# Patient Record
Sex: FEMALE | Race: WHITE | NOT HISPANIC OR LATINO
[De-identification: names, ages, dates, MRNs, and addresses within clinical notes are randomized per-mention and may not be internally consistent; named-entity substitution may affect disease eponyms.]

---

## 2018-08-08 PROBLEM — Z00.00 ENCOUNTER FOR PREVENTIVE HEALTH EXAMINATION: Status: ACTIVE | Noted: 2018-08-08

## 2018-08-13 PROBLEM — Z86.39 HISTORY OF HYPERLIPIDEMIA: Status: RESOLVED | Noted: 2018-08-13 | Resolved: 2018-08-13

## 2018-08-13 PROBLEM — Z80.0 FAMILY HISTORY OF MALIGNANT NEOPLASM OF COLON: Status: ACTIVE | Noted: 2018-08-13

## 2018-08-13 PROBLEM — Z87.09 HISTORY OF ASTHMA: Status: RESOLVED | Noted: 2018-08-13 | Resolved: 2018-08-13

## 2018-08-13 PROBLEM — Z86.39 HISTORY OF THYROID DISORDER: Status: RESOLVED | Noted: 2018-08-13 | Resolved: 2018-08-13

## 2018-08-13 PROBLEM — Z86.59 HISTORY OF DEPRESSION: Status: RESOLVED | Noted: 2018-08-13 | Resolved: 2018-08-13

## 2018-08-13 PROBLEM — Z87.39 HISTORY OF FIBROMYALGIA: Status: RESOLVED | Noted: 2018-08-13 | Resolved: 2018-08-13

## 2018-08-13 PROBLEM — Z78.9 CAFFEINE USE: Status: ACTIVE | Noted: 2018-08-13

## 2018-08-13 PROBLEM — Z86.59 HISTORY OF ANXIETY: Status: RESOLVED | Noted: 2018-08-13 | Resolved: 2018-08-13

## 2018-08-13 PROBLEM — Z85.038 HISTORY OF MALIGNANT NEOPLASM OF COLON: Status: RESOLVED | Noted: 2018-08-13 | Resolved: 2018-08-13

## 2018-08-13 PROBLEM — Z87.891 FORMER SMOKER: Status: ACTIVE | Noted: 2018-08-13

## 2018-08-13 PROBLEM — Z80.0 FAMILY HISTORY OF PANCREATIC CANCER: Status: ACTIVE | Noted: 2018-08-13

## 2018-08-13 PROBLEM — Z80.3 FAMILY HISTORY OF BREAST CANCER: Status: ACTIVE | Noted: 2018-08-13

## 2018-08-13 PROBLEM — Z80.41 FAMILY HISTORY OF MALIGNANT NEOPLASM OF OVARY: Status: ACTIVE | Noted: 2018-08-13

## 2018-08-13 RX ORDER — PREDNISONE 5 MG
5 TABLET, DOSE PACK ORAL
Refills: 0 | Status: ACTIVE | COMMUNITY

## 2018-08-13 RX ORDER — MULTIVIT-MIN/FA/LYCOPEN/LUTEIN .4-300-25
TABLET ORAL
Refills: 0 | Status: ACTIVE | COMMUNITY

## 2018-08-13 RX ORDER — ATORVASTATIN CALCIUM 80 MG/1
TABLET, FILM COATED ORAL
Refills: 0 | Status: ACTIVE | COMMUNITY

## 2018-08-13 RX ORDER — LEVOTHYROXINE SODIUM 137 UG/1
TABLET ORAL
Refills: 0 | Status: ACTIVE | COMMUNITY

## 2018-08-13 RX ORDER — DULOXETINE HYDROCHLORIDE 30 MG/1
CAPSULE, DELAYED RELEASE ORAL
Refills: 0 | Status: ACTIVE | COMMUNITY

## 2018-08-13 RX ORDER — POTASSIUM CHLORIDE 600 MG/1
TABLET, FILM COATED, EXTENDED RELEASE ORAL
Refills: 0 | Status: ACTIVE | COMMUNITY

## 2018-08-13 RX ORDER — BUDESONIDE AND FORMOTEROL FUMARATE DIHYDRATE 160; 4.5 UG/1; UG/1
AEROSOL RESPIRATORY (INHALATION)
Refills: 0 | Status: ACTIVE | COMMUNITY

## 2018-08-13 RX ORDER — TORSEMIDE 5 MG/1
TABLET ORAL
Refills: 0 | Status: ACTIVE | COMMUNITY

## 2018-08-14 ENCOUNTER — APPOINTMENT (OUTPATIENT)
Dept: BREAST CENTER | Facility: CLINIC | Age: 78
End: 2018-08-14
Payer: MEDICARE

## 2018-08-14 VITALS
DIASTOLIC BLOOD PRESSURE: 80 MMHG | HEIGHT: 66 IN | SYSTOLIC BLOOD PRESSURE: 121 MMHG | HEART RATE: 90 BPM | WEIGHT: 220 LBS | BODY MASS INDEX: 35.36 KG/M2

## 2018-08-14 DIAGNOSIS — Z86.39 PERSONAL HISTORY OF OTHER ENDOCRINE, NUTRITIONAL AND METABOLIC DISEASE: ICD-10-CM

## 2018-08-14 DIAGNOSIS — C50.912 MALIGNANT NEOPLASM OF UNSPECIFIED SITE OF LEFT FEMALE BREAST: ICD-10-CM

## 2018-08-14 DIAGNOSIS — Z86.59 PERSONAL HISTORY OF OTHER MENTAL AND BEHAVIORAL DISORDERS: ICD-10-CM

## 2018-08-14 DIAGNOSIS — Z87.09 PERSONAL HISTORY OF OTHER DISEASES OF THE RESPIRATORY SYSTEM: ICD-10-CM

## 2018-08-14 DIAGNOSIS — Z78.9 OTHER SPECIFIED HEALTH STATUS: ICD-10-CM

## 2018-08-14 DIAGNOSIS — Z87.39 PERSONAL HISTORY OF OTHER DISEASES OF THE MUSCULOSKELETAL SYSTEM AND CONNECTIVE TISSUE: ICD-10-CM

## 2018-08-14 DIAGNOSIS — Z80.0 FAMILY HISTORY OF MALIGNANT NEOPLASM OF DIGESTIVE ORGANS: ICD-10-CM

## 2018-08-14 DIAGNOSIS — Z80.41 FAMILY HISTORY OF MALIGNANT NEOPLASM OF OVARY: ICD-10-CM

## 2018-08-14 DIAGNOSIS — Z12.31 ENCOUNTER FOR SCREENING MAMMOGRAM FOR MALIGNANT NEOPLASM OF BREAST: ICD-10-CM

## 2018-08-14 DIAGNOSIS — Z17.0 MALIGNANT NEOPLASM OF UNSPECIFIED SITE OF LEFT FEMALE BREAST: ICD-10-CM

## 2018-08-14 DIAGNOSIS — Z80.3 FAMILY HISTORY OF MALIGNANT NEOPLASM OF BREAST: ICD-10-CM

## 2018-08-14 DIAGNOSIS — Z86.711 PERSONAL HISTORY OF PULMONARY EMBOLISM: ICD-10-CM

## 2018-08-14 DIAGNOSIS — Z85.038 PERSONAL HISTORY OF OTHER MALIGNANT NEOPLASM OF LARGE INTESTINE: ICD-10-CM

## 2018-08-14 DIAGNOSIS — I25.2 OLD MYOCARDIAL INFARCTION: ICD-10-CM

## 2018-08-14 DIAGNOSIS — Z87.891 PERSONAL HISTORY OF NICOTINE DEPENDENCE: ICD-10-CM

## 2018-08-14 DIAGNOSIS — Z87.81 PERSONAL HISTORY OF (HEALED) TRAUMATIC FRACTURE: ICD-10-CM

## 2018-08-14 PROCEDURE — 99213 OFFICE O/P EST LOW 20 MIN: CPT

## 2018-08-14 RX ORDER — PANTOPRAZOLE 20 MG/1
20 TABLET, DELAYED RELEASE ORAL
Refills: 0 | Status: ACTIVE | COMMUNITY

## 2018-08-14 RX ORDER — RIVAROXABAN 2.5 MG/1
TABLET, FILM COATED ORAL
Refills: 0 | Status: ACTIVE | COMMUNITY

## 2018-08-14 RX ORDER — PANTOPRAZOLE SODIUM 40 MG/10ML
40 INJECTION, POWDER, FOR SOLUTION INTRAVENOUS
Refills: 0 | Status: ACTIVE | COMMUNITY

## 2023-02-17 NOTE — HISTORY OF PRESENT ILLNESS
[FreeTextEntry1] : This is a 82 year old female s/p left PMX/SNE 3/2013 for a stage I invasive lobular carcinoma , TicN0, ER/AL+,  her2-. Pt is s/p Taxol & herceptin (Dr. Gonzalez). She is s/p RTX (Dr. Ricardo). She was initially started on taxol, carboplatin and herceptin (Gold) and had pancolitis and was changed to TH (Ajiaz). 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 \par \par Patient was last seen by medical oncologist (Dr. Cervantes) 04/2017. Patient c/o of left breast pain.\par \par She does SBE.\par She has not noticed a change in her breast or a breast lump. \par She has not noticed a change in her nipple or nipple area. \par She has not noticed a change in the skin of the breast. \par She is not experiencing nipple discharge. \par She is not experiencing breast pain. \par She has not noticed a lump or lymph node under the armpit. \par \par BREAST CANCER RISK FACTORS \par Menarche: 12\par Date of LMP: Menopause: post \par Grav: 6 Para: 2 \par Age at first live birth: 25 \par Nursed: no \par Hysterectomy: no\par Oophorectomy: no \par OCP: no \par HRT: yes \par Last pap/pelvic exam: does not recall \par Related family history: no Ashkenazi: no [place]/[employer] lifetime risk: n/a BRCA testing: Kaye\par \par Last mammogram: 03/03/2017 right (bilat 07/22/2016)   Location: [facility]\par Report reviewed.\par Results: BIRADS 3 \par No adverse interval mammographic changes. Probably benign island of fibroglandular tissue in the lower outer quadrant approx 8:00 4cm from the nipple, without suspicious sonographic correlate\par \par Last ultrasound: 03/03/2017 right (bilat 07/22/2016)     Location: [facility]\par Report reviewed. \par Results: Birads 3 \par No sonographic suspicious finding in the area of mammographic concern approximately 8:00 4cm from the nipple.\par \par  Last MRI: preop

## 2023-02-21 ENCOUNTER — APPOINTMENT (OUTPATIENT)
Dept: BREAST CENTER | Facility: CLINIC | Age: 83
End: 2023-02-21

## 2023-02-21 DIAGNOSIS — R92.2 INCONCLUSIVE MAMMOGRAM: ICD-10-CM

## 2023-03-16 ENCOUNTER — NON-APPOINTMENT (OUTPATIENT)
Age: 83
End: 2023-03-16

## 2023-03-21 ENCOUNTER — APPOINTMENT (OUTPATIENT)
Dept: BREAST CENTER | Facility: CLINIC | Age: 83
End: 2023-03-21